# Patient Record
Sex: FEMALE | Race: WHITE
[De-identification: names, ages, dates, MRNs, and addresses within clinical notes are randomized per-mention and may not be internally consistent; named-entity substitution may affect disease eponyms.]

---

## 2017-12-12 ENCOUNTER — HOSPITAL ENCOUNTER (EMERGENCY)
Dept: HOSPITAL 11 - JP.ED | Age: 72
Discharge: HOME | End: 2017-12-12
Payer: MEDICARE

## 2017-12-12 VITALS — SYSTOLIC BLOOD PRESSURE: 157 MMHG | DIASTOLIC BLOOD PRESSURE: 78 MMHG

## 2017-12-12 DIAGNOSIS — F17.210: ICD-10-CM

## 2017-12-12 DIAGNOSIS — Z79.899: ICD-10-CM

## 2017-12-12 DIAGNOSIS — Z88.2: ICD-10-CM

## 2017-12-12 DIAGNOSIS — E03.9: ICD-10-CM

## 2017-12-12 DIAGNOSIS — R42: Primary | ICD-10-CM

## 2017-12-12 DIAGNOSIS — Z88.5: ICD-10-CM

## 2017-12-12 DIAGNOSIS — Z79.82: ICD-10-CM

## 2017-12-12 DIAGNOSIS — I10: ICD-10-CM

## 2017-12-12 PROCEDURE — 85025 COMPLETE CBC W/AUTO DIFF WBC: CPT

## 2017-12-12 PROCEDURE — 36415 COLL VENOUS BLD VENIPUNCTURE: CPT

## 2017-12-12 PROCEDURE — 99284 EMERGENCY DEPT VISIT MOD MDM: CPT

## 2017-12-12 PROCEDURE — 80053 COMPREHEN METABOLIC PANEL: CPT

## 2017-12-12 NOTE — EDM.PDOC
ED HPI GENERAL MEDICAL PROBLEM





- General


Chief Complaint: General


Stated Complaint: MEDICAL VIA NORTH


Time Seen by Provider: 12/12/17 10:22


Source of Information: Reports: Patient, RN Notes Reviewed


History Limitations: Reports: No Limitations





- History of Present Illness


INITIAL COMMENTS - FREE TEXT/NARRATIVE: 





This 72-year-old female presents emergency department today via EMS services 

for sudden onset of nausea and vomiting, she states she's had this for the last 

couple days does have a history of vertigo she states this particular event 

started when she rolled over in bed put her head to the left side sudden onset 

of nausea and vomiting, feels like typical events she's had in the past also 

does have a history of ovarian cancer





- Related Data


 Allergies











Allergy/AdvReac Type Severity Reaction Status Date / Time


 


Sulfa (Sulfonamide Allergy Unknown Rash Verified 12/01/14 11:36





Antibiotics)     


 


codeine AdvReac Unknown Vomiting Verified 12/01/14 11:36











Home Meds: 


 Home Meds





Aspirin 81 mg PO DAILY 09/03/13 [History]


Etodolac 400 mg PO BID 09/03/13 [History]


Levothyroxine Sodium [Synthroid] 75 mcg PO DAILY 09/03/13 [History]


Lisinopril [Prinivil] 20 mg PO DAILY 09/03/13 [History]


Nitroglycerin [Nitrostat] 0.4 mg SL ASDIRECTED PRN 09/03/13 [History]


Venlafaxine HCl [Venlafaxine ER] 150 mg PO DAILY 09/03/13 [History]


Multivitamin [Multivitamins] 1 each PO DAILY 11/08/13 [History]


Metoprolol Tartrate [Lopressor] 100 mg PO BID #60 tablet 12/08/14 [Rx]


Albuterol Sulfate [Proair Hfa] 2 puff INH Q4H PRN 12/12/17 [History]


Nicotine [Nicoderm CQ] 1 patch TOP DAILY 12/12/17 [History]


atorvaSTATin [Lipitor] 20 mg PO DAILY 12/12/17 [History]


tiZANidine [Zanaflex] 4 mg PO TID PRN 12/12/17 [History]











Past Medical History


Cardiovascular History: Reports: Hypertension


Respiratory History: Reports: Bronchitis, Recurrent


OB/GYN History: Reports: Pregnancy


Musculoskeletal History: Reports: Back Pain, Chronic


Endocrine/Metabolic History: Reports: Hypothyroidism


Oncologic (Cancer) History: Reports: Ovarian





- Infectious Disease History


Infectious Disease History: Reports: Chicken Pox, Measles, Mumps, Shingles





- Past Surgical History


GI Surgical History: Reports: Appendectomy, Cholecystectomy


Musculoskeletal Surgical History: Reports: Knee Replacement





Social & Family History





- Tobacco Use


Smoking Status *Q: Current Every Day Smoker


Years of Tobacco use: 50


Packs/Tins Daily: 0.5


Used Tobacco, but Quit: No


Month Tobacco Last Used: December


Second Hand Smoke Exposure: No





- Caffeine Use


Caffeine Use: Reports: Coffee





- Alcohol Use


Days Per Week of Alcohol Use: 1


Number of Drinks Per Day: 2


Total Drinks Per Week: 2





- Recreational Drug Use


Recreational Drug Use: No





ED ROS GENERAL





- Review of Systems


Review Of Systems: See Below


Constitutional: Denies: Fever, Chills


HEENT: Reports: No Symptoms


Respiratory: Reports: No Symptoms


Cardiovascular: Reports: No Symptoms


GI/Abdominal: Reports: No Symptoms


: Reports: No Symptoms





ED EXAM, GENERAL





- Physical Exam


Exam: See Below


Free Text/Narrative:: 





General: Female, not in any distress, alert and oriented x3 HEENT: head is 

atraumatic normocephalic, eyes pupils equal round reactive to light, sclera 

clear no conjunctivitis appreciated extraocular eye movements intact.  Ears 

tympanic membranes clear and gray landmarks and light reflex are present 

bilaterally canals are clear.  Nose no septal deviation, nares are clear, no 

blood present.  Mouth mucosa is dry and pink no erythema or exudate noted in 

soft palate, tongue is midline uvula is midline, dentition is intact.


Neck: Supple no thyromegaly no tracheal deviation.


Nodes: Cervical nodes subclavicular nodes nontender no palpable lymphadenopathy 

noted.


Lungs: clear to auscultation bilaterally with symmetrical respirations, no 

adventitious noise appreciated.


CV: Regular rate and rhythm S1 and S2 appreciated no murmurs rubs or gallops 

noted.


cover uncover test negative, no saccade on HINTS exam





Course





- Vital Signs


Last Recorded V/S: 


 Last Vital Signs











Temp  98.2 F   12/12/17 10:10


 


Pulse  66   12/12/17 10:10


 


Resp  23 H  12/12/17 10:10


 


BP  157/78 H  12/12/17 10:10


 


Pulse Ox  95   12/12/17 10:10














- Orders/Labs/Meds


Orders: 


 Active Orders 24 hr











 Category Date Time Status


 


 UA W/MICROSCOPIC [URIN] Urgent Lab  12/12/17 10:33 Uncollected











Labs: 


 Laboratory Tests











  12/12/17 12/12/17 Range/Units





  10:50 10:50 


 


WBC  9.2   (4.5-11.0)  K/uL


 


RBC  4.17   (3.30-5.50)  M/uL


 


Hgb  13.2  D   (12.0-15.0)  g/dL


 


Hct  41.5   (36.0-48.0)  %


 


MCV  100 H   (80-98)  fL


 


MCH  32 H   (27-31)  pg


 


MCHC  32   (32-36)  %


 


Plt Count  466 H   (150-400)  K/uL


 


Neut % (Auto)  71 H   (36-66)  %


 


Lymph % (Auto)  17 L   (24-44)  %


 


Mono % (Auto)  5   (2-6)  %


 


Eos % (Auto)  5 H   (2-4)  %


 


Baso % (Auto)  1   (0-1)  %


 


Sodium   142  (140-148)  mmol/L


 


Potassium   4.4  (3.6-5.2)  mmol/L


 


Chloride   107  (100-108)  mmol/L


 


Carbon Dioxide   26  (21-32)  mmol/L


 


Anion Gap   9.5  (5.0-14.0)  mmol/L


 


BUN   16  (7-18)  mg/dL


 


Creatinine   1.3 H  (0.6-1.0)  mg/dL


 


Est Cr Clr Drug Dosing   30.03  mL/min


 


Estimated GFR (MDRD)   40 L  (>60)  


 


Glucose   117 H  ()  mg/dL


 


Calcium   9.2  D  (8.5-10.1)  mg/dL


 


Total Bilirubin   0.3  (0.2-1.0)  mg/dL


 


AST   18  (15-37)  U/L


 


ALT   15  D  (12-78)  U/L


 


Alkaline Phosphatase   109  ()  U/L


 


Total Protein   7.2  (6.4-8.2)  g/dL


 


Albumin   1.9 L  (3.4-5.0)  g/dL


 


Globulin   5.3 H  (2.3-3.5)  g/dL


 


Albumin/Globulin Ratio   0.4 L  (1.2-2.2)  











Meds: 


Medications














Discontinued Medications














Generic Name Dose Route Start Last Admin





  Trade Name Freq  PRN Reason Stop Dose Admin


 


Meclizine HCl  25 mg  12/12/17 11:04  12/12/17 11:08





  Antivert  PO  12/12/17 11:05  25 mg





  ONETIME ONE   Administration














Departure





- Departure


Time of Disposition: 12:39


Disposition: Home, Self-Care 01


Condition: Good


Clinical Impression: 


 Vertigo








- Discharge Information


Referrals: 


Radha Williamson CNM [Primary Care Provider] - 


Forms:  ED Department Discharge


Additional Instructions: 


Continue your regular medications, try the meclizine 1 tablet up to 3 times a 

day as needed for dizzy symptoms,  Please followup with your primary care 

provider in 3-5 days if not better, please call return to the emergency 

department with worsening of symptoms.





- My Orders


Last 24 Hours: 


My Active Orders





12/12/17 10:33


UA W/MICROSCOPIC [URIN] Urgent 














- Assessment/Plan


Last 24 Hours: 


My Active Orders





12/12/17 10:33


UA W/MICROSCOPIC [URIN] Urgent 











Plan: 





Assessment





Acuity = acute on chronic





Site and laterality = exacerbation of vertigo





Etiology  unclear etiology





Manifestations = none





Location of injury =  Home





Lab values = creatinine elevated 1.3 consistent chronic renal failure stage G 

IIIB albumin low at 1.9 consistent hypoalbuminemia EKG performed in route by 

EMS demonstrates a normal sinus rhythm no ST changes or elevations noted





Plan


 she had good improvement with meclizine provided with resolution of her 

symptoms follow-up with her primary care as needed

















Patient was in agreement with the plan all questions were answered, they were 

instructed to return to the emergency department or call for worsening 

symptoms.  This note was dictated using dragon voice recognition software 

please call with any questions.

## 2018-03-31 ENCOUNTER — HOSPITAL ENCOUNTER (INPATIENT)
Dept: HOSPITAL 11 - JP.ED | Age: 73
LOS: 2 days | Discharge: HOME | DRG: 372 | End: 2018-04-02
Attending: INTERNAL MEDICINE | Admitting: INTERNAL MEDICINE
Payer: MEDICARE

## 2018-03-31 DIAGNOSIS — A04.72: Primary | ICD-10-CM

## 2018-03-31 DIAGNOSIS — C56.2: ICD-10-CM

## 2018-03-31 DIAGNOSIS — R68.84: ICD-10-CM

## 2018-03-31 DIAGNOSIS — F32.9: ICD-10-CM

## 2018-03-31 DIAGNOSIS — F17.200: ICD-10-CM

## 2018-03-31 DIAGNOSIS — C56.9: ICD-10-CM

## 2018-03-31 DIAGNOSIS — I12.9: ICD-10-CM

## 2018-03-31 DIAGNOSIS — Z88.8: ICD-10-CM

## 2018-03-31 DIAGNOSIS — Z79.899: ICD-10-CM

## 2018-03-31 DIAGNOSIS — I10: ICD-10-CM

## 2018-03-31 DIAGNOSIS — E86.0: ICD-10-CM

## 2018-03-31 DIAGNOSIS — Z88.2: ICD-10-CM

## 2018-03-31 DIAGNOSIS — N18.3: ICD-10-CM

## 2018-03-31 DIAGNOSIS — E03.9: ICD-10-CM

## 2018-03-31 PROCEDURE — 87046 STOOL CULTR AEROBIC BACT EA: CPT

## 2018-03-31 PROCEDURE — 87899 AGENT NOS ASSAY W/OPTIC: CPT

## 2018-03-31 PROCEDURE — 85025 COMPLETE CBC W/AUTO DIFF WBC: CPT

## 2018-03-31 PROCEDURE — 87493 C DIFF AMPLIFIED PROBE: CPT

## 2018-03-31 PROCEDURE — 99285 EMERGENCY DEPT VISIT HI MDM: CPT

## 2018-03-31 PROCEDURE — 71045 X-RAY EXAM CHEST 1 VIEW: CPT

## 2018-03-31 PROCEDURE — 36415 COLL VENOUS BLD VENIPUNCTURE: CPT

## 2018-03-31 PROCEDURE — 84484 ASSAY OF TROPONIN QUANT: CPT

## 2018-03-31 PROCEDURE — 96360 HYDRATION IV INFUSION INIT: CPT

## 2018-03-31 PROCEDURE — 80053 COMPREHEN METABOLIC PANEL: CPT

## 2018-03-31 RX ADMIN — VANCOMYCIN HYDROCHLORIDE SCH MG: 1 INJECTION, POWDER, LYOPHILIZED, FOR SOLUTION INTRAVENOUS at 21:49

## 2018-03-31 RX ADMIN — VENLAFAXINE HYDROCHLORIDE SCH MG: 75 CAPSULE, EXTENDED RELEASE ORAL at 20:15

## 2018-03-31 RX ADMIN — VANCOMYCIN HYDROCHLORIDE SCH MG: 1 INJECTION, POWDER, LYOPHILIZED, FOR SOLUTION INTRAVENOUS at 17:20

## 2018-03-31 RX ADMIN — Medication SCH CAP: at 20:16

## 2018-03-31 NOTE — PCM.HP
H&P History of Present Illness





- General


Date of Service: 03/31/18


Admit Problem/Dx: 


 Admission Diagnosis/Problem





Admission Diagnosis/Problem      Clostridium difficile diarrhea








Source of Information: Patient, Provider


History Limitations: Reports: No Limitations





- History of Present Illness


Initial Comments - Free Text/Narative: 





Wen presented to the ER this morning with bilateral jaw pressure and left 

shoulder pain. She reports onset of moderately severe pressure in her jaws that 

radiated to the left shoulder. She will not call it pain but says it was more 

of a pressure discomfort. When the pain did not get better after a few minutes 

she decided take a nitroglycerin which completely relieved the pain. She has 

never had pain like this before. She was concerned because her  had jaw 

pain at the time of a heart attack years ago. She also reports having multiple 

watery diarrheal stools over the past several days. She has had significant 

nausea for the last 3 or 4 days and has had very little to eat or drink other 

than supper last night. She is not aware of any fevers. She does not have any 

abdominal pain. She has not vomited. No recent antibiotics. She has had recent 

chemotherapy for her ovarian cancer.





Workup in the emergency room revealed leukopenia and evidence for Clostridium 

difficile colitis. EKG did not suggest ischemia and troponin was undetectable. 

She will be admitted for management of Clostridium difficile colitis.


  ** Denies


Pain Score (Numeric/FACES): 0





- Related Data


Allergies/Adverse Reactions: 


 Allergies











Allergy/AdvReac Type Severity Reaction Status Date / Time


 


Sulfa (Sulfonamide Allergy Unknown Rash Verified 03/31/18 09:37





Antibiotics)     


 


codeine AdvReac Unknown Vomiting Verified 03/31/18 09:37











Home Medications: 


 Home Meds





Aspirin 81 mg PO DAILY 09/03/13 [History]


Etodolac 400 mg PO BID 09/03/13 [History]


Levothyroxine Sodium [Synthroid] 75 mcg PO DAILY 09/03/13 [History]


Lisinopril [Prinivil] 5 mg PO DAILY 09/03/13 [History]


Nitroglycerin [Nitrostat] 0.4 mg SL ASDIRECTED PRN 09/03/13 [History]


Venlafaxine HCl [Venlafaxine ER] 150 mg PO DAILY 09/03/13 [History]


Multivitamin [Multivitamins] 1 each PO DAILY 11/08/13 [History]


Metoprolol Tartrate [Lopressor] 100 mg PO BID #60 tablet 12/08/14 [Rx]


Albuterol Sulfate [Proair Hfa] 2 puff INH Q4H PRN 12/12/17 [History]


atorvaSTATin [Lipitor] 20 mg PO DAILY 12/12/17 [History]


tiZANidine [Zanaflex] 4 mg PO TID PRN 12/12/17 [History]


Prochlorperazine Maleate [Compazine] 10 mg PO ASDIRECTED PRN 03/28/18 [History]











Past Medical History


HEENT History: Reports: Hard of Hearing, Impaired Vision


Cardiovascular History: Reports: Hypertension


Respiratory History: Reports: Bronchitis, Recurrent


Genitourinary History: Reports: Other (See Below)


Other Genitourinary History: l kidney not working.


OB/GYN History: Reports: Pregnancy


Musculoskeletal History: Reports: Back Pain, Chronic


Neurological History: Reports: Other (See Below)


Other Neuro History: fibro myalgia


Psychiatric History: Reports: Depression


Endocrine/Metabolic History: Reports: Hypothyroidism


Oncologic (Cancer) History: Reports: Ovarian





- Infectious Disease History


Infectious Disease History: Reports: Chicken Pox, Measles, Mumps, Shingles





- Past Surgical History


GI Surgical History: Reports: Appendectomy, Cholecystectomy


Musculoskeletal Surgical History: Reports: Knee Replacement





Social & Family History





- Family History


Cardiac: Reports: Afib, Arrhythmia





- Tobacco Use


Smoking Status *Q: Current Every Day Smoker


Years of Tobacco use: 50


Packs/Tins Daily: 0.5


Used Tobacco, but Quit: No


Month/Year Tobacco Last Used: December


Second Hand Smoke Exposure: No





- Caffeine Use


Caffeine Use: Reports: Coffee





- Alcohol Use


Days Per Week of Alcohol Use: 1


Number of Drinks Per Day: 2


Total Drinks Per Week: 2





- Recreational Drug Use


Recreational Drug Use: No





H&P Review of Systems





- Review of Systems:


Review Of Systems: See Below


Free Text/Narrative: 





A complete 12 point review of systems was obtained.  Pertinent positives and 

negatives are noted in the history of present illness.  All other systems were 

reviewed and were negative except as noted.





Exam





- Exam


Exam: See Below





- Vital Signs


Vital Signs: 


 Last Vital Signs











Temp  35.7 C   03/31/18 09:10


 


Pulse  68   03/31/18 09:10


 


Resp  22 H  03/31/18 09:10


 


BP  113/77   03/31/18 09:10


 


Pulse Ox  97   03/31/18 09:10











Weight: 89.358 kg





- Exam


Quality Assessment: No: Supplemental Oxygen


General: Alert, Oriented, Cooperative.  No: Mild Distress


HEENT: Conjunctiva Clear, Other (poor dentition ).  No: Mucosa Moist & Pink (dry

), Scleral Icterus


Neck: Supple, Trachea Midline


Lungs: Clear to Auscultation, Normal Respiratory Effort


Cardiovascular: Regular Rate, Regular Rhythm, Systolic Murmur


GI/Abdominal Exam: Normal Bowel Sounds, Soft, Non-Tender, No Distention


Back Exam: Normal Inspection, Full Range of Motion


Extremities: No Pedal Edema.  No: Increased Warmth


Peripheral Pulses: 2+: Dorsalis Pedis (L), Dorsalis Pedis (R)


Skin: Warm, Dry


Neuro Extensive - Mental Status: Alert, Oriented x3, Nl Response to Commands


Neuro Extensive - Motor, Sensory, Reflexes: CN II-XII Intact.  No: Dysarthria, 

Abnormal Motor, Tremor





- Patient Data


Lab Results Last 24 hrs: 


 Laboratory Results - last 24 hr











  03/31/18 03/31/18 03/31/18 Range/Units





  08:51 08:51 08:51 


 


WBC   1.6 L   (4.5-11.0)  K/uL


 


RBC   3.74   (3.30-5.50)  M/uL


 


Hgb   12.0   (12.0-15.0)  g/dL


 


Hct   36.0   (36.0-48.0)  %


 


MCV   96   (80-98)  fL


 


MCH   32 H   (27-31)  pg


 


MCHC   33   (32-36)  %


 


Plt Count   451 H   (150-400)  K/uL


 


Neut % (Auto)   8 L   (36-66)  %


 


Lymph % (Auto)   61 H   (24-44)  %


 


Mono % (Auto)   21 H   (2-6)  %


 


Eos % (Auto)   7 H   (2-4)  %


 


Baso % (Auto)   3 H   (0-1)  %


 


Sodium    136 L  (140-148)  mmol/L


 


Potassium    4.4  (3.6-5.2)  mmol/L


 


Chloride    102  (100-108)  mmol/L


 


Carbon Dioxide    23  (21-32)  mmol/L


 


Anion Gap    15.4 H  (5.0-14.0)  mmol/L


 


BUN    46 H D  (7-18)  mg/dL


 


Creatinine    1.5 H  (0.6-1.0)  mg/dL


 


Est Cr Clr Drug Dosing    25.63  mL/min


 


Estimated GFR (MDRD)    34 L  (>60)  


 


Glucose    118 H  ()  mg/dL


 


Calcium    8.9  (8.5-10.1)  mg/dL


 


Total Bilirubin    0.3  (0.2-1.0)  mg/dL


 


AST    33  D  (15-37)  U/L


 


ALT    30  D  (12-78)  U/L


 


Alkaline Phosphatase    95  ()  U/L


 


Troponin I  < 0.017    (0.000-0.056)  ng/mL


 


Total Protein    7.2  (6.4-8.2)  g/dL


 


Albumin    2.8 L  (3.4-5.0)  g/dL


 


Globulin    4.4 H  (2.3-3.5)  g/dL


 


Albumin/Globulin Ratio    0.6 L  (1.2-2.2)  











Result Diagrams: 


 03/31/18 08:51





 03/31/18 08:51


Sunday Results Last 24 hrs: 


 Microbiology











 03/31/18 09:23 Clostridium difficile (PCR) - Final





 Stool / Feces    Positive C. Diff Toxin











Imaging Impressions Last 24 hrs: 





CXR - images personally reviewed - no mass, infiltrate  noted. Probable small 

right effusion. Heart size borderline enlarged. 





*Q Meaningful Use (ADM)





- VTE Risk Assess *Q


Each Risk Factor Represents 1 Point: Obesity ( BMI > 25 kg/m2)


Total Score 1 Point Risk Factors: 1


Each Risk Factor Represents 2 Points: Malignancy (present or previous)


Total Score 2 Point Risk Factors: 2


Each Risk Factor Represents 3 Points: Age 75 Years or Greater


Total Score 3 Point Risk Factors: 3


Each Risk Factor Represents 5 Points: None


Total Score 5 Point Risk Factors: 0


Venous Thromboembolism Risk Factor Score *Q: 6





- Problem List


(1) Clostridium difficile diarrhea


SNOMED Code(s): 7496075228382


   ICD Code: A04.72 - ENTEROCOLITIS D/T CLOSTRIDIUM DIFFICILE, NOT SPCF AS 

RECUR   Status: Acute   Current Visit: Yes   





(2) Dehydration


SNOMED Code(s): 70989355


   ICD Code: E86.0 - DEHYDRATION   Status: Acute   Current Visit: Yes   





(3) Jaw pain


SNOMED Code(s): 200070804


   ICD Code: R68.84 - JAW PAIN   Status: Acute   Current Visit: Yes   





(4) Ovarian cancer on left


SNOMED Code(s): 704518207


   ICD Code: C56.2 - MALIGNANT NEOPLASM OF LEFT OVARY   Status: Chronic   

Current Visit: Yes   





(5) CKD (chronic kidney disease), stage III


SNOMED Code(s): 000916974


   ICD Code: N18.3 - CHRONIC KIDNEY DISEASE, STAGE 3 (MODERATE)   Status: 

Chronic   Current Visit: Yes   


Problem List Initiated/Reviewed/Updated: Yes


Orders Last 24hrs: 


 Active Orders 24 hr











 Category Date Time Status


 


 Patient Status Manage Transfer [TRANSFER] Routine ADT  03/31/18 11:24 Ordered


 


 Chest 1V Frontal [CR] Stat Exams  03/31/18 10:14 Taken


 


 CLOSTRIDIUM DIFFICILE BY PCR [RM] Stat Lab  03/31/18 09:23 Ordered


 


 CULTURE STOOL + SHIGATOX [RM] Stat Lab  03/31/18 09:23 Received


 


 UA W/MICROSCOPIC [URIN] Urgent Lab  03/31/18 08:51 Ordered


 


 Sodium Chloride 0.9% [Normal Saline] 1,000 ml Med  03/31/18 09:15 Active





 IV ASDIRECTED   


 


 Resuscitation Status Routine Resus Stat  03/31/18 11:26 Ordered








 Medication Orders





Sodium Chloride (Normal Saline)  1,000 mls @ 999 mls/hr IV ASDIRECTED KAILEY


   Last Admin: 03/31/18 09:24  Dose: 999 mls/hr








Assessment/Plan Comment:: 





ASSESSMENT AND PLAN - 





Clostridium difficile colitis - no recent antibiotics. Suspect immunosuppressed 

state as the cause. Infection is complicated by active malignancy and 

chemotherapy. She is moderately dehydrated but does not appear acutely ill 

beyond that.


-Vancomycin 125 mg 4 times daily


-IV fluids through the day


-Contact precautions 


-probiotics





Jaw pain/pressure - Some concern for anginal equivalent. Initial troponin and 

EKG did not suggest ischemia. Pain is resolved at this time. His 

gastrointestinal source for pain most likely given recent nausea and active 

colitis.


-Second troponin


-Twice daily PPI


-Additional symptomatic management





Stage III chronic kidney disease - Creatinine is very near recent baseline but 

there is evidence for some dehydration.


-IV fluids


-Repeat labs in the morning





Ovarian cancer - Followed by the [facility], receiving chemotherapy locally. 

Has had recent chemotherapy.


-Outpatient follow-up





Maintenance issues - 


- DVT prophylaxis - Mechanical 


- GI prophylaxis - PPI


- Nutrition - mechanical soft


- Stanley catheter - Not indicated





CODE STATUS - Full code





Admission justification - This patient will be admitted for inpatient services 

and is medically appropriate meeting medical necessity for inpatient admission 

as outlined in my documentation.  I reasonably expect the patient will require 

inpatient services that span a period time over 2 midnights. I reasonably 

expect this patient to be discharged or transferred within 96 hours after 

admission to the [facility].





Disposition - Anticipate discharge home after the hospital stay





Primary care physician - Leatha Yañez M.D.

## 2018-03-31 NOTE — EDM.PDOC
ED HPI GENERAL MEDICAL PROBLEM





- General


Chief Complaint: General


Stated Complaint: MEDICAL VIA NORTH


Time Seen by Provider: 03/31/18 09:28


Source of Information: Reports: Patient


History Limitations: Reports: No Limitations





- History of Present Illness


INITIAL COMMENTS - FREE TEXT/NARRATIVE: 





pt developed severe jaw pain which wouldn,t go away. She states she took 1 

nitro at home and it got alot better. 


Onset: Today, Sudden, Other (pt just had chemo and she has been having severe 

diarrhea. )


Duration: Hour(s):


Location: Reports: Chest, Other (pt has a history of ovarian ca. )


Associated Symptoms: Reports: Other (diarhea)


  ** Denies


Pain Score (Numeric/FACES): 0





  ** Generalized


Pain Score (Numeric/FACES): 0





- Related Data


 Allergies











Allergy/AdvReac Type Severity Reaction Status Date / Time


 


Sulfa (Sulfonamide Allergy Unknown Rash Verified 03/31/18 09:37





Antibiotics)     


 


codeine AdvReac Unknown Vomiting Verified 03/31/18 09:37











Home Meds: 


 Home Meds





Aspirin 81 mg PO DAILY 09/03/13 [History]


Etodolac 400 mg PO BID 09/03/13 [History]


Levothyroxine Sodium [Synthroid] 75 mcg PO DAILY 09/03/13 [History]


Lisinopril [Prinivil] 5 mg PO DAILY 09/03/13 [History]


Nitroglycerin [Nitrostat] 0.4 mg SL ASDIRECTED PRN 09/03/13 [History]


Venlafaxine HCl [Venlafaxine ER] 150 mg PO DAILY 09/03/13 [History]


Multivitamin [Multivitamins] 1 each PO DAILY 11/08/13 [History]


Metoprolol Tartrate [Lopressor] 100 mg PO BID #60 tablet 12/08/14 [Rx]


Albuterol Sulfate [Proair Hfa] 2 puff INH Q4H PRN 12/12/17 [History]


atorvaSTATin [Lipitor] 20 mg PO DAILY 12/12/17 [History]


tiZANidine [Zanaflex] 4 mg PO TID PRN 12/12/17 [History]


Prochlorperazine Maleate [Compazine] 10 mg PO ASDIRECTED PRN 03/28/18 [History]


Lactobacillus Rhamnosus GG [Culturelle] 1 cap PO BID #60 cap 04/02/18 [Rx]


Pantoprazole [ProTONIX***] 40 mg PO DAILY #30 tab.cr 04/02/18 [Rx]


Vancomycin [Vancocin 250 MG/5 ML Soln] 125 mg PO QID #120 ml 04/02/18 [Rx]











Past Medical History


Cardiovascular History: Reports: Hypertension


Respiratory History: Reports: Bronchitis, Recurrent


OB/GYN History: Reports: Pregnancy


Musculoskeletal History: Reports: Back Pain, Chronic


Endocrine/Metabolic History: Reports: Hypothyroidism


Oncologic (Cancer) History: Reports: Ovarian





- Infectious Disease History


Infectious Disease History: Reports: Chicken Pox, Measles, Mumps, Shingles





- Past Surgical History


GI Surgical History: Reports: Appendectomy, Cholecystectomy


Musculoskeletal Surgical History: Reports: Knee Replacement





Social & Family History





- Tobacco Use


Smoking Status *Q: Current Every Day Smoker


Years of Tobacco use: 50


Packs/Tins Daily: 0.5


Used Tobacco, but Quit: No


Month/Year Tobacco Last Used: December


Second Hand Smoke Exposure: No





- Caffeine Use


Caffeine Use: Reports: Coffee





- Alcohol Use


Days Per Week of Alcohol Use: 1


Number of Drinks Per Day: 2


Total Drinks Per Week: 2





- Recreational Drug Use


Recreational Drug Use: No





ED ROS GENERAL





- Review of Systems


Review Of Systems: See Below


Constitutional: Reports: No Symptoms


HEENT: Reports: No Symptoms


Respiratory: Reports: No Symptoms


Cardiovascular: Reports: No Symptoms


Endocrine: Reports: No Symptoms


GI/Abdominal: Reports: Diarrhea, Other ( chest pain. )


: Reports: No Symptoms


Musculoskeletal: Reports: No Symptoms


Skin: Reports: No Symptoms





ED EXAM, GENERAL





- Physical Exam


Exam: See Below


Free Text/Narrative:: 





pt developed severe  jaw pain when she got up this am. She has had multiple 

loose stools during the nite. She did just finish a course of chemo. 


Exam Limited By: No Limitations


General Appearance: Alert, Mild Distress, Other ( The jaw pain went away with 

the one nitro and she is pain free now. She has had multiple loose stools since 

she arrived. )


Ears: Normal TMs


Nose: Normal Inspection


Throat/Mouth: Normal Inspection


Head: Atraumatic


Neck: Normal Inspection


Respiratory/Chest: No Respiratory Distress


Cardiovascular: Regular Rate, Rhythm, Tachycardia


GI/Abdominal: Soft, Non-Tender, Other ( The stools she is having is very loose 

and alot of mucous)


 (Female) Exam: Deferred


Rectal (Female) Exam: Deferred


Back Exam: Normal Inspection


Extremities: Normal Inspection


Neurological: Alert, Oriented, Normal Cognition


Psychiatric: Normal Affect





Course





- Vital Signs


Last Recorded V/S: 


 Last Vital Signs











Temp  36.3 C   04/02/18 07:48


 


Pulse  66   04/02/18 08:01


 


Resp  18   04/02/18 07:48


 


BP  135/79   04/02/18 08:01


 


Pulse Ox  100   04/02/18 07:48














- Orders/Labs/Meds


Labs: 


 Laboratory Tests











  03/31/18 03/31/18 03/31/18 Range/Units





  08:51 08:51 08:51 


 


WBC   1.6 L   (4.5-11.0)  K/uL


 


RBC   3.74   (3.30-5.50)  M/uL


 


Hgb   12.0   (12.0-15.0)  g/dL


 


Hct   36.0   (36.0-48.0)  %


 


MCV   96   (80-98)  fL


 


MCH   32 H   (27-31)  pg


 


MCHC   33   (32-36)  %


 


Plt Count   451 H   (150-400)  K/uL


 


Neut % (Auto)   8 L   (36-66)  %


 


Lymph % (Auto)   61 H   (24-44)  %


 


Mono % (Auto)   21 H   (2-6)  %


 


Eos % (Auto)   7 H   (2-4)  %


 


Baso % (Auto)   3 H   (0-1)  %


 


Sodium    136 L  (140-148)  mmol/L


 


Potassium    4.4  (3.6-5.2)  mmol/L


 


Chloride    102  (100-108)  mmol/L


 


Carbon Dioxide    23  (21-32)  mmol/L


 


Anion Gap    15.4 H  (5.0-14.0)  mmol/L


 


BUN    46 H D  (7-18)  mg/dL


 


Creatinine    1.5 H  (0.6-1.0)  mg/dL


 


Est Cr Clr Drug Dosing    25.63  mL/min


 


Estimated GFR (MDRD)    34 L  (>60)  


 


Glucose    118 H  ()  mg/dL


 


Calcium    8.9  (8.5-10.1)  mg/dL


 


Total Bilirubin    0.3  (0.2-1.0)  mg/dL


 


AST    33  D  (15-37)  U/L


 


ALT    30  D  (12-78)  U/L


 


Alkaline Phosphatase    95  ()  U/L


 


Troponin I  < 0.017    (0.000-0.056)  ng/mL


 


Total Protein    7.2  (6.4-8.2)  g/dL


 


Albumin    2.8 L  (3.4-5.0)  g/dL


 


Globulin    4.4 H  (2.3-3.5)  g/dL


 


Albumin/Globulin Ratio    0.6 L  (1.2-2.2)  











Meds: 


Medications














Discontinued Medications














Generic Name Dose Route Start Last Admin





  Trade Name Freq  PRN Reason Stop Dose Admin


 


Acetaminophen  650 mg  03/31/18 13:20  04/02/18 04:14





  Tylenol  PO   650 mg





  Q4H PRN   Administration





  Pain (Mild 1-3)/fever   





     





     





     


 


Aspirin  81 mg  04/01/18 09:00  04/02/18 08:00





  Aspirin  PO   81 mg





  DAILY KAILEY   Administration





     





     





     





     


 


Atorvastatin Calcium  20 mg  03/31/18 21:00  04/01/18 20:50





  Lipitor  PO   Not Given





  BEDTIME KAILEY   





     





     





     





     


 


Etodolac  400 mg  03/31/18 21:00  





  Etodolac  PO   





  BID KAILEY   





     





     





     





     


 


Sodium Chloride  1,000 mls @ 999 mls/hr  03/31/18 09:15  03/31/18 09:24





  Normal Saline  IV   999 mls/hr





  ASDIRECTED KAILEY   Administration





     





     





     





     


 


Sodium Chloride  1,000 mls @ 125 mls/hr  03/31/18 13:20  04/01/18 06:43





  Normal Saline  IV   125 mls/hr





  ASDIRECTED KAILEY   Administration





     





     





     





     


 


Lactobacillus Rhamnosus  1 cap  03/31/18 21:00  04/02/18 08:00





  Culturelle  PO   1 cap





  BID KAILEY   Administration





     





     





     





     


 


Levothyroxine Sodium  75 mcg  04/01/18 07:30  04/02/18 07:59





  Levothyroxine  PO   75 mcg





  DAILY@0730 KAILEY   Administration





     





     





     





     


 


Lisinopril  5 mg  04/01/18 09:00  04/02/18 08:00





  Prinivil  PO   5 mg





  DAILY KAILEY   Administration





     





     





     





     


 


Loperamide HCl  4 mg  03/31/18 09:30  03/31/18 09:24





  Imodium  PO  03/31/18 09:31  4 mg





  ONETIME ONE   Administration





     





     





     





     


 


Metoprolol Tartrate  100 mg  03/31/18 21:00  04/02/18 08:01





  Lopressor  PO   100 mg





  BID KAILEY   Administration





     





     





     





     


 


Ondansetron HCl  4 mg  03/31/18 13:20  





  Zofran Odt  PO   





  Q6H PRN   





  Nausea able to take PO   





     





     





     


 


Ondansetron HCl  4 mg  03/31/18 13:20  





  Zofran  IV   





  Q6H PRN   





  Nausea/Vomiting   





     





     





     


 


Oxycodone HCl  5 mg  03/31/18 13:20  





  Oxycodone  PO   





  Q4H PRN   





  Pain (moderate 4-6)   





     





     





     


 


Pantoprazole Sodium  40 mg  03/31/18 16:30  04/02/18 07:59





  Protonix***  PO   40 mg





  BIDAC KAILEY   Administration





     





     





     





     


 


Tizanidine HCl  4 mg  03/31/18 13:20  





  Zanaflex  PO   





  TID PRN   





  Spasms   





     





     





     


 


Vancomycin HCl  125 mg  03/31/18 16:00  04/02/18 10:45





  Vancocin 250 Mg/5 Ml Soln  PO   125 mg





  QID KAILEY   Administration





     





     





     





     


 


Venlafaxine HCl  150 mg  04/01/18 09:00  04/02/18 08:01





  Effexor Xr  PO   150 mg





  DAILY AKILEY   Administration





     





     





     





     














- Re-Assessments/Exams


Free Text/Narrative Re-Assessment/Exam: 





03/31/18 10:32


pt has a positive cdiff. She has a normal trop. She has a creatnine of 1.5. She 

has evidence of dehydration. Her wbc is 1500. 





Departure





- Departure


Time of Disposition: 10:33


Disposition: Admitted As Inpatient 64


Condition: Fair


Clinical Impression: 


 Clostridial infection, Dehydration, Jaw pain








- Discharge Information

## 2018-04-01 RX ADMIN — Medication SCH CAP: at 08:05

## 2018-04-01 RX ADMIN — VANCOMYCIN HYDROCHLORIDE SCH MG: 1 INJECTION, POWDER, LYOPHILIZED, FOR SOLUTION INTRAVENOUS at 10:37

## 2018-04-01 RX ADMIN — VANCOMYCIN HYDROCHLORIDE SCH MG: 1 INJECTION, POWDER, LYOPHILIZED, FOR SOLUTION INTRAVENOUS at 21:00

## 2018-04-01 RX ADMIN — VANCOMYCIN HYDROCHLORIDE SCH MG: 1 INJECTION, POWDER, LYOPHILIZED, FOR SOLUTION INTRAVENOUS at 16:24

## 2018-04-01 RX ADMIN — VENLAFAXINE HYDROCHLORIDE SCH MG: 75 CAPSULE, EXTENDED RELEASE ORAL at 08:07

## 2018-04-01 RX ADMIN — Medication SCH CAP: at 20:49

## 2018-04-01 RX ADMIN — VANCOMYCIN HYDROCHLORIDE SCH MG: 1 INJECTION, POWDER, LYOPHILIZED, FOR SOLUTION INTRAVENOUS at 06:42

## 2018-04-01 NOTE — PCM.PN
- General Info


Date of Service: 04/01/18


Functional Status: Reports: Pain Controlled, Tolerating Diet





- Review of Systems


General: Denies: Fever


Cardiovascular: Denies: Chest Pain


Gastrointestinal: Reports: Diarrhea


Systems Review Comment:: 





no acute events overnight. No recurrence of the jaw pain overnight but she did 

have some pain this morning. Bilateral jaw pain resolved after drinking water 

and belching. She has had several episodes of watery diarrhea but no fevers or 

abdominal pain. White blood cell count is slightly better today but absolute 

neutrophil count is still very low. Patient feels well and is tolerating her 

diet. She is hoping to be able to go home tomorrow.





- Patient Data


Vitals - Most Recent: 


 Last Vital Signs











Temp  37.0 C   04/01/18 08:01


 


Pulse  73   04/01/18 08:05


 


Resp  16   04/01/18 08:01


 


BP  130/62   04/01/18 08:06


 


Pulse Ox  97   04/01/18 08:01











Weight - Most Recent: 88.723 kg


I&O - Last 24 Hours: 


 Intake & Output











 03/31/18 04/01/18 04/01/18





 22:59 06:59 14:59


 


Intake Total 480 1982 


 


Output Total 100 850 


 


Balance 380 1132 











Lab Results Last 24 Hours: 


 Laboratory Results - last 24 hr











  03/31/18 04/01/18 04/01/18 Range/Units





  15:21 01:05 05:18 


 


WBC    2.2 L  (4.5-11.0)  K/uL


 


RBC    3.37  (3.30-5.50)  M/uL


 


Hgb    10.8 L  (12.0-15.0)  g/dL


 


Hct    33.1 L  (36.0-48.0)  %


 


MCV    98  (80-98)  fL


 


MCH    32 H  (27-31)  pg


 


MCHC    33  (32-36)  %


 


Plt Count    351  (150-400)  K/uL


 


Neut % (Auto)    6 L  (36-66)  %


 


Lymph % (Auto)    62 H  (24-44)  %


 


Mono % (Auto)    23 H  (2-6)  %


 


Eos % (Auto)    7 H  (2-4)  %


 


Baso % (Auto)    2 H  (0-1)  %


 


Sodium     (140-148)  mmol/L


 


Potassium     (3.6-5.2)  mmol/L


 


Chloride     (100-108)  mmol/L


 


Carbon Dioxide     (21-32)  mmol/L


 


Anion Gap     (5.0-14.0)  mmol/L


 


BUN     (7-18)  mg/dL


 


Creatinine     (0.6-1.0)  mg/dL


 


Est Cr Clr Drug Dosing     mL/min


 


Estimated GFR (MDRD)     (>60)  


 


Glucose     ()  mg/dL


 


Calcium     (8.5-10.1)  mg/dL


 


Magnesium     (1.8-2.4)  mg/dL


 


Troponin I  < 0.017    (0.000-0.056)  ng/mL


 


Urine Color   Yellow   


 


Urine Appearance   Cloudy   


 


Urine pH   5.0   (4.5-8.0)  


 


Ur Specific Gravity   1.020   (1.008-1.030)  


 


Urine Protein   Negative   (NEGATIVE)  mg/dL


 


Urine Glucose (UA)   Normal   (NEGATIVE)  mg/dL


 


Urine Ketones   Negative   (NEGATIVE)  mg/dL


 


Urine Occult Blood   Negative   (NEGATIVE)  


 


Urine Nitrite   Negative   (NEGATIVE)  


 


Urine Bilirubin   Small   (NEGATIVE)  


 


Urine Urobilinogen   Normal   (NORMAL)  mg/dL


 


Ur Leukocyte Esterase   Negative   (NEGATIVE)  


 


Urine RBC   Not seen   (0-5)  


 


Urine WBC   0-5   (0-5)  


 


Ur Epithelial Cells   Few   


 


Amorphous Sediment   Not seen   


 


Urine Bacteria   Many   


 


Urine Mucus   Not seen   














  04/01/18 Range/Units





  05:18 


 


WBC   (4.5-11.0)  K/uL


 


RBC   (3.30-5.50)  M/uL


 


Hgb   (12.0-15.0)  g/dL


 


Hct   (36.0-48.0)  %


 


MCV   (80-98)  fL


 


MCH   (27-31)  pg


 


MCHC   (32-36)  %


 


Plt Count   (150-400)  K/uL


 


Neut % (Auto)   (36-66)  %


 


Lymph % (Auto)   (24-44)  %


 


Mono % (Auto)   (2-6)  %


 


Eos % (Auto)   (2-4)  %


 


Baso % (Auto)   (0-1)  %


 


Sodium  141  (140-148)  mmol/L


 


Potassium  4.2  (3.6-5.2)  mmol/L


 


Chloride  108  (100-108)  mmol/L


 


Carbon Dioxide  22  (21-32)  mmol/L


 


Anion Gap  11.4  (5.0-14.0)  mmol/L


 


BUN  29 H  (7-18)  mg/dL


 


Creatinine  1.3 H  (0.6-1.0)  mg/dL


 


Est Cr Clr Drug Dosing  29.57  mL/min


 


Estimated GFR (MDRD)  40 L  (>60)  


 


Glucose  108 H  ()  mg/dL


 


Calcium  7.9 L  (8.5-10.1)  mg/dL


 


Magnesium  2.0  D  (1.8-2.4)  mg/dL


 


Troponin I   (0.000-0.056)  ng/mL


 


Urine Color   


 


Urine Appearance   


 


Urine pH   (4.5-8.0)  


 


Ur Specific Gravity   (1.008-1.030)  


 


Urine Protein   (NEGATIVE)  mg/dL


 


Urine Glucose (UA)   (NEGATIVE)  mg/dL


 


Urine Ketones   (NEGATIVE)  mg/dL


 


Urine Occult Blood   (NEGATIVE)  


 


Urine Nitrite   (NEGATIVE)  


 


Urine Bilirubin   (NEGATIVE)  


 


Urine Urobilinogen   (NORMAL)  mg/dL


 


Ur Leukocyte Esterase   (NEGATIVE)  


 


Urine RBC   (0-5)  


 


Urine WBC   (0-5)  


 


Ur Epithelial Cells   


 


Amorphous Sediment   


 


Urine Bacteria   


 


Urine Mucus   











Sunday Results Last 24 Hours: 


 Microbiology











 03/31/18 09:23 Stool Culture - Preliminary





 Stool / Feces  - Final





    NEGATIVE FOR SHIGA TOXIN 1





  - Final





    NEGATIVE FOR SHIGA TOXIN 2


 


 03/31/18 09:23 Clostridium difficile (PCR) - Final





 Stool / Feces    Positive C. Diff Toxin











Med Orders - Current: 


 Current Medications





Acetaminophen (Tylenol)  650 mg PO Q4H PRN


   PRN Reason: Pain (Mild 1-3)/fever


   Last Admin: 03/31/18 23:06 Dose:  650 mg


Aspirin (Aspirin)  81 mg PO DAILY [facility]


   Last Admin: 04/01/18 08:05 Dose:  81 mg


Atorvastatin Calcium (Lipitor)  20 mg PO BEDTIME [facility]


   Last Admin: 03/31/18 20:17 Dose:  Not Given


Lactobacillus Rhamnosus (Culturelle)  1 cap PO BID [facility]


   Last Admin: 04/01/18 08:05 Dose:  1 cap


Levothyroxine Sodium (Levothyroxine)  75 mcg PO DAILY@0730 [facility]


   Last Admin: 04/01/18 08:05 Dose:  75 mcg


Lisinopril (Prinivil)  5 mg PO DAILY [facility]


   Last Admin: 04/01/18 08:06 Dose:  5 mg


Metoprolol Tartrate (Lopressor)  100 mg PO BID [facility]


   Last Admin: 04/01/18 08:05 Dose:  100 mg


Ondansetron HCl (Zofran Odt)  4 mg PO Q6H PRN


   PRN Reason: Nausea able to take PO


Ondansetron HCl (Zofran)  4 mg IV Q6H PRN


   PRN Reason: Nausea/Vomiting


Oxycodone HCl (Oxycodone)  5 mg PO Q4H PRN


   PRN Reason: Pain (moderate 4-6)


Pantoprazole Sodium (Protonix***)  40 mg PO BIDAC [facility]


   Last Admin: 04/01/18 08:05 Dose:  40 mg


Tizanidine HCl (Zanaflex)  4 mg PO TID PRN


   PRN Reason: Spasms


Vancomycin HCl (Vancocin 250 Mg/5 Ml Soln)  125 mg PO QID [facility]


   Last Admin: 04/01/18 06:42 Dose:  125 mg


Venlafaxine HCl (Effexor Xr)  150 mg PO DAILY [facility]


   Last Admin: 04/01/18 08:07 Dose:  150 mg





Discontinued Medications





Etodolac (Etodolac)  400 mg PO BID [facility]


Sodium Chloride (Normal Saline)  1,000 mls @ 999 mls/hr IV ASDIRECTED [facility]


   Last Admin: 03/31/18 09:24 Dose:  999 mls/hr


Sodium Chloride (Normal Saline)  1,000 mls @ 125 mls/hr IV ASDIRECTED [facility]


   Last Admin: 04/01/18 06:43 Dose:  125 mls/hr


Loperamide HCl (Imodium)  4 mg PO ONETIME ONE


   Stop: 03/31/18 09:31


   Last Admin: 03/31/18 09:24 Dose:  4 mg











- Exam


Quality Assessment: No: Supplemental Oxygen


General: Alert, Oriented, Cooperative, No Acute Distress


Neck: Supple


Lungs: Clear to Auscultation, Normal Respiratory Effort


Cardiovascular: Regular Rate, Regular Rhythm


GI/Abdominal Exam: Soft, No Distention


Extremities: No Pedal Edema


Skin: Warm, Intact


Psy/Mental Status: Alert, Normal Affect





- Problem List & Annotations


(1) Clostridium difficile diarrhea


SNOMED Code(s): 7474314175044


   Code(s): A04.72 - ENTEROCOLITIS D/T CLOSTRIDIUM DIFFICILE, NOT SPCF AS RECUR

   Status: Acute   Current Visit: Yes   





(2) Dehydration


SNOMED Code(s): 83157683


   Code(s): E86.0 - DEHYDRATION   Status: Acute   Current Visit: Yes   





(3) Jaw pain


SNOMED Code(s): 539818105


   Code(s): R68.84 - JAW PAIN   Status: Acute   Current Visit: Yes   





(4) Ovarian cancer on left


SNOMED Code(s): 215010305


   Code(s): C56.2 - MALIGNANT NEOPLASM OF LEFT OVARY   Status: Chronic   

Current Visit: Yes   





(5) CKD (chronic kidney disease), stage III


SNOMED Code(s): 007978037


   Code(s): N18.3 - CHRONIC KIDNEY DISEASE, STAGE 3 (MODERATE)   Status: 

Chronic   Current Visit: Yes   





- Problem List Review


Problem List Initiated/Reviewed/Updated: Yes





- My Orders


Last 24 Hours: 


My Active Orders





03/31/18 11:26


Resuscitation Status Routine 





03/31/18 12:25


Isolation [COMM] Routine 





03/31/18 13:20


Patient Status [ADT] Routine 


Intake and Output [RC] QSHIFT 


May Shower [RC] ASDIRECTED 


Notify Provider Vital Signs [RC] ASDIRECTED 


Oxygen Therapy [RC] PRN 


Up With Assistance [RC] ASDIRECTED 


VTE/DVT Education [RC] Per Unit Routine 


Vital Signs [RC] Q4H 


Acetaminophen [Tylenol]   650 mg PO Q4H PRN 


Ondansetron [Zofran ODT]   4 mg PO Q6H PRN 


Ondansetron [Zofran]   4 mg IV Q6H PRN 


oxyCODONE   5 mg PO Q4H PRN 


tiZANidine [Zanaflex]   4 mg PO TID PRN 


Sequential Compression Device [OM.PC] Per Unit Routine 





03/31/18 15:41


Assess Discharge Needs [OM.PC] Routine 





03/31/18 16:00


Vancomycin [Vancocin 250 MG/5 ML Soln]   125 mg PO QID 





03/31/18 16:30


Pantoprazole [ProTONIX***]   40 mg PO BIDAC 





03/31/18 18:42


PT Screening [OM.PC] Routine 





03/31/18 21:00


Lactobacillus Rhamnosus GG [Culturelle]   1 cap PO BID 


Metoprolol Tartrate [Lopressor]   100 mg PO BID 


atorvaSTATin [Lipitor]   20 mg PO BEDTIME 





03/31/18 Lunch


Mechanical Soft Diet [DIET] 





04/01/18 07:30


Levothyroxine   75 mcg PO DAILY@0730 





04/01/18 09:00


Aspirin   81 mg PO DAILY 


Lisinopril [Prinivil]   5 mg PO DAILY 


Venlafaxine [Effexor XR]   150 mg PO DAILY 





04/01/18 10:33


Discontinue Telemetry Monitoring [Cardiac Monitoring Discontinue] [RC] Click to 

Edit 


Convert IV to Saline Lock [OM.PC] Routine 





04/02/18 05:00


BASIC METABOLIC PANEL,BMP [CHEM] Timed 


CBC W/O DIFF,HEMOGRAM [HEME] Timed (1) 














- Plan


Plan:: 





ASSESSMENT AND PLAN - 





Clostridium difficile colitis - no recent antibiotics. Suspect immunosuppressed 

state as the cause. Infection is complicated by active malignancy and 

chemotherapy. volume status much better today. Having diarrhea but no pain or 

fevers.


-Vancomycin 125 mg 4 times daily


-saline lock IV fluids


-Contact precautions 


-probiotics





Jaw pain/pressure - Some concern for anginal equivalent but both troponins were 

normal and she has been stable overnight. Suspect gastrointestinal source in 

the setting of infection and recent chemotherapy.


-Twice daily PPI initiated


-Additional symptomatic management





Stage III chronic kidney disease - Creatinine level improved overnight with 

hydration.


-encourage oral intake


-Repeat labs in the morning





Ovarian cancer - Followed by the [facility], receiving chemotherapy locally. 

Has had recent chemotherapy.


-Outpatient follow-up, planning to transfer care to the [facility] 

to be near her family in the [place]





Maintenance issues - 


- DVT prophylaxis - Mechanical 


- GI prophylaxis - PPI


- Nutrition - mechanical soft





Disposition - Anticipate discharge home after the hospital stay, possibly 

tomorrow if stable overnight





Primary care physician - Leatha Yañez M.D.

## 2018-04-02 VITALS — SYSTOLIC BLOOD PRESSURE: 135 MMHG | DIASTOLIC BLOOD PRESSURE: 79 MMHG

## 2018-04-02 RX ADMIN — Medication SCH CAP: at 08:00

## 2018-04-02 RX ADMIN — VANCOMYCIN HYDROCHLORIDE SCH MG: 1 INJECTION, POWDER, LYOPHILIZED, FOR SOLUTION INTRAVENOUS at 06:05

## 2018-04-02 RX ADMIN — VANCOMYCIN HYDROCHLORIDE SCH MG: 1 INJECTION, POWDER, LYOPHILIZED, FOR SOLUTION INTRAVENOUS at 10:45

## 2018-04-02 RX ADMIN — VENLAFAXINE HYDROCHLORIDE SCH MG: 75 CAPSULE, EXTENDED RELEASE ORAL at 08:01

## 2018-04-02 NOTE — CR
Chest 1V Frontal

 

HISTORY: sob

 

COMPARISON: 3/28/2018

 

FINDINGS: 

Lungs appear clear and normally aerated. Cardiomediastinal silhouette is within normal limits for the
 AP technique. Atherosclerotic aorta is redemonstrated. No vascular redistribution or pleural fluid c
an be seen there are degenerative hypertrophic changes both shoulders. Anterolateral aspects are note
d along the thoracic spine.

 

IMPRESSION: 

No acute chest abnormality or significant interval change is identified.

## 2018-04-02 NOTE — PCM.DCSUM1
**Discharge Summary





- Hospital Course


Brief History: This patient is a 73-year-old woman with a known history of 

ovarian cancer currently receiving chemotherapy. She was admitted through the 

emergency department with diarrhea secondary to C. difficile colitis as well as 

jaw and shoulder pain.





- Discharge Data


Discharge Date: 04/02/18


Discharge Disposition: Home, Self-Care 01


Condition: Fair





- Discharge Diagnosis/Problem(s)


(1) Jaw pain


SNOMED Code(s): 060625097


   ICD Code: R68.84 - JAW PAIN   Status: Acute   Current Visit: Yes   





(2) Clostridium difficile diarrhea


SNOMED Code(s): 5543519108125


   ICD Code: A04.72 - ENTEROCOLITIS D/T CLOSTRIDIUM DIFFICILE, NOT SPCF AS 

RECUR   Status: Acute   Current Visit: Yes   





(3) Ovarian cancer on left


SNOMED Code(s): 114159416


   ICD Code: C56.2 - MALIGNANT NEOPLASM OF LEFT OVARY   Status: Chronic   

Current Visit: Yes   





(4) CKD (chronic kidney disease), stage III


SNOMED Code(s): 576414453


   ICD Code: N18.3 - CHRONIC KIDNEY DISEASE, STAGE 3 (MODERATE)   Status: 

Chronic   Current Visit: Yes   





(5) HTN, Essential hypertension


SNOMED Code(s): 73846988


   ICD Code: I10 - ESSENTIAL (PRIMARY) HYPERTENSION   Status: Chronic   Current 

Visit: No   





- Patient Summary/Data


Hospital Course: 





Wen presented to the ER on the morning of admission with bilateral jaw 

pressure and left shoulder pain. She reports onset of moderately severe 

pressure in her jaws that radiated to the left shoulder. She will not call it 

pain but says it was more of a pressure discomfort. When the pain did not get 

better after a few minutes she decided take a nitroglycerin which completely 

relieved the pain. She has never had pain like this before. She was concerned 

because her  had jaw pain at the time of a heart attack years ago. She 

also reports having multiple watery diarrheal stools over the past several 

days. She has had significant nausea for the last 3 or 4 days and has had very 

little to eat or drink other than supper last night. She is not aware of any 

fevers. She does not have any abdominal pain. She has not vomited. No recent 

antibiotics. She has had recent chemotherapy for her ovarian cancer. Workup in 

the emergency room revealed leukopenia and evidence for Clostridium difficile 

colitis. EKG did not suggest ischemia and troponin was undetectable. She was 

admitted for management of Clostridium difficile colitis.





On admission she was given IV fluids for hydration and started on oral 

antibiotic therapy with vancomycin 125 mg 4 times daily. She improved over the 

next 2 days of hospitalization and by the time of discharge was having no 

further episodes of diarrhea. White blood cell count was monitored daily and 

remained low throughout her hospital stay. This was thought secondary to her 

ongoing chemotherapy for management of ovarian carcinoma. Serial troponin 

levels were obtained in all remained within normal range and she had no further 

symptoms of chest pain or pressure. Further review of her symptoms of jaw pain 

were felt to be consistent with a GI source likely esophageal reflux. She will 

be discharged home on additional 12 days of oral antibiotic therapy with 

vancomycin as well as a probiotic and oral Protonix. Activity will be as 

tolerated and she will resume her usual diet. Follow-up appointments have been 

arranged with both oncology as well as her primary care provider.





- Patient Instructions


Diet: Usual Diet as Tolerated


Activity: As Tolerated


Other/Special Instructions: Follow-up appointments with oncology and primary 

care as scheduled





- Discharge Plan


Prescriptions/Med Rec: 


Lactobacillus Rhamnosus GG [Culturelle] 1 cap PO BID #60 cap


Vancomycin [Vancocin 250 MG/5 ML Soln] 125 mg PO QID #120 ml


Home Medications: 


 Home Meds





Aspirin 81 mg PO DAILY 09/03/13 [History]


Etodolac 400 mg PO BID 09/03/13 [History]


Levothyroxine Sodium [Synthroid] 75 mcg PO DAILY 09/03/13 [History]


Lisinopril [Prinivil] 5 mg PO DAILY 09/03/13 [History]


Nitroglycerin [Nitrostat] 0.4 mg SL ASDIRECTED PRN 09/03/13 [History]


Venlafaxine HCl [Venlafaxine ER] 150 mg PO DAILY 09/03/13 [History]


Multivitamin [Multivitamins] 1 each PO DAILY 11/08/13 [History]


Metoprolol Tartrate [Lopressor] 100 mg PO BID #60 tablet 12/08/14 [Rx]


Albuterol Sulfate [Proair Hfa] 2 puff INH Q4H PRN 12/12/17 [History]


atorvaSTATin [Lipitor] 20 mg PO DAILY 12/12/17 [History]


tiZANidine [Zanaflex] 4 mg PO TID PRN 12/12/17 [History]


Prochlorperazine Maleate [Compazine] 10 mg PO ASDIRECTED PRN 03/28/18 [History]


Lactobacillus Rhamnosus GG [Culturelle] 1 cap PO BID #60 cap 04/02/18 [Rx]


Vancomycin [Vancocin 250 MG/5 ML Soln] 125 mg PO QID #120 ml 04/02/18 [Rx]








Patient Handouts:  Clostridium Difficile Infection, Easy-to-Read


Referrals: 


Abigail Wolff MD [Ordering Only Provider] - 04/11/18 9:00 am (

Infusion center at 9:30 for 4 hrs)


Radha Williamson CNM [Primary Care Provider] - 04/09/18 1:30 pm (lab first 

at 1:00)





- Discharge Summary/Plan Comment


DC Time >30 min.: No





- Patient Data


Vitals - Most Recent: 


 Last Vital Signs











Temp  97.4 F   04/02/18 07:48


 


Pulse  66   04/02/18 08:01


 


Resp  18   04/02/18 07:48


 


BP  135/79   04/02/18 08:01


 


Pulse Ox  100   04/02/18 07:48











Weight - Most Recent: 195 lb 9.6 oz


I&O - Last 24 hours: 


 Intake & Output











 04/01/18 04/02/18 04/02/18





 22:59 06:59 14:59


 


Intake Total 940 400 280


 


Balance 940 400 280











Lab Results - Last 24 hrs: 


 Laboratory Results - last 24 hr











  04/02/18 04/02/18 Range/Units





  05:00 05:00 


 


WBC  2.2 L   (4.5-11.0)  K/uL


 


RBC  3.24 L   (3.30-5.50)  M/uL


 


Hgb  10.2 L   (12.0-15.0)  g/dL


 


Hct  32.1 L   (36.0-48.0)  %


 


MCV  99 H   (80-98)  fL


 


MCH  32 H   (27-31)  pg


 


MCHC  32   (32-36)  %


 


Plt Count  325   (150-400)  K/uL


 


Sodium   144  (140-148)  mmol/L


 


Potassium   4.5  (3.6-5.2)  mmol/L


 


Chloride   111 H  (100-108)  mmol/L


 


Carbon Dioxide   25  (21-32)  mmol/L


 


Anion Gap   12.5  (5.0-14.0)  mmol/L


 


BUN   14  D  (7-18)  mg/dL


 


Creatinine   1.1 H  (0.6-1.0)  mg/dL


 


Est Cr Clr Drug Dosing   34.95  mL/min


 


Estimated GFR (MDRD)   48 L  (>60)  


 


Glucose   85  ()  mg/dL


 


Calcium   8.4 L  (8.5-10.1)  mg/dL











SARAHY Results - Last 24 hrs: 


 Microbiology











 03/31/18 09:23 Stool Culture - Preliminary





 Stool / Feces    NORMAL ENTERIC MERY 2 DAYS





  - Final





    NEGATIVE FOR SHIGA TOXIN 1





  - Final





    NEGATIVE FOR SHIGA TOXIN 2











Med Orders - Current: 


 Current Medications





Acetaminophen (Tylenol)  650 mg PO Q4H PRN


   PRN Reason: Pain (Mild 1-3)/fever


   Last Admin: 04/02/18 04:14 Dose:  650 mg


Aspirin (Aspirin)  81 mg PO DAILY [facility]


   Last Admin: 04/02/18 08:00 Dose:  81 mg


Atorvastatin Calcium (Lipitor)  20 mg PO BEDTIME [facility]


   Last Admin: 04/01/18 20:50 Dose:  Not Given


Lactobacillus Rhamnosus (Culturelle)  1 cap PO BID [facility]


   Last Admin: 04/02/18 08:00 Dose:  1 cap


Levothyroxine Sodium (Levothyroxine)  75 mcg PO DAILY@0730 [facility]


   Last Admin: 04/02/18 07:59 Dose:  75 mcg


Lisinopril (Prinivil)  5 mg PO DAILY [facility]


   Last Admin: 04/02/18 08:00 Dose:  5 mg


Metoprolol Tartrate (Lopressor)  100 mg PO BID [facility]


   Last Admin: 04/02/18 08:01 Dose:  100 mg


Ondansetron HCl (Zofran Odt)  4 mg PO Q6H PRN


   PRN Reason: Nausea able to take PO


Ondansetron HCl (Zofran)  4 mg IV Q6H PRN


   PRN Reason: Nausea/Vomiting


Oxycodone HCl (Oxycodone)  5 mg PO Q4H PRN


   PRN Reason: Pain (moderate 4-6)


[facility]  [facility]


   Last Admin: 04/02/18 07:59 Dose:  40 mg


Tizanidine HCl (Zanaflex)  4 mg PO TID PRN


   PRN Reason: Spasms


Vancomycin HCl (Vancocin 250 Mg/5 Ml Soln)  125 mg PO QID [facility]


   Last Admin: 04/02/18 10:45 Dose:  125 mg


Venlafaxine HCl (Effexor Xr)  150 mg PO DAILY [facility]


   Last Admin: 04/02/18 08:01 Dose:  150 mg





Discontinued Medications





Etodolac (Etodolac)  400 mg PO BID [facility]


Sodium Chloride (Normal Saline)  1,000 mls @ 999 mls/hr IV ASDIRECTED [facility]


   Last Admin: 03/31/18 09:24 Dose:  999 mls/hr


Sodium Chloride (Normal Saline)  1,000 mls @ 125 mls/hr IV ASDIRECTED [facility]


   Last Admin: 04/01/18 06:43 Dose:  125 mls/hr


Loperamide HCl (Imodium)  4 mg PO ONETIME ONE


   Stop: 03/31/18 09:31


   Last Admin: 03/31/18 09:24 Dose:  4 mg











- Exam


General: Reports: Alert, Oriented, Cooperative, No Acute Distress


Lungs: Reports: Clear to Auscultation, Normal Respiratory Effort


Cardiovascular: Reports: Regular Rate, Regular Rhythm, No Murmurs


GI/Abdominal Exam: Soft, Non-Tender, No Organomegaly, No Distention


Extremities: Non-Tender, No Pedal Edema


Skin: Reports: Warm, Dry, Intact

## 2018-05-25 ENCOUNTER — HOSPITAL ENCOUNTER (OUTPATIENT)
Dept: HOSPITAL 11 - JP.SDS | Age: 73
Discharge: HOME | End: 2018-05-25
Attending: SURGERY
Payer: MEDICARE

## 2018-05-25 VITALS — DIASTOLIC BLOOD PRESSURE: 76 MMHG | SYSTOLIC BLOOD PRESSURE: 138 MMHG

## 2018-05-25 DIAGNOSIS — C56.9: ICD-10-CM

## 2018-05-25 DIAGNOSIS — N18.9: ICD-10-CM

## 2018-05-25 DIAGNOSIS — T82.898A: Primary | ICD-10-CM

## 2018-05-25 DIAGNOSIS — Z88.5: ICD-10-CM

## 2018-05-25 DIAGNOSIS — Z88.2: ICD-10-CM

## 2018-05-25 DIAGNOSIS — E03.9: ICD-10-CM

## 2018-05-25 DIAGNOSIS — I13.0: ICD-10-CM

## 2018-05-25 DIAGNOSIS — F32.9: ICD-10-CM

## 2018-05-25 PROCEDURE — 36561 INSERT TUNNELED CV CATH: CPT

## 2018-05-25 PROCEDURE — C1788 PORT, INDWELLING, IMP: HCPCS

## 2018-05-25 PROCEDURE — 71045 X-RAY EXAM CHEST 1 VIEW: CPT

## 2018-05-25 RX ADMIN — SODIUM CHLORIDE ONE ML: 0.9 INJECTION, SOLUTION INTRAVENOUS at 12:51

## 2018-05-25 RX ADMIN — SODIUM CHLORIDE ONE ML: 0.9 INJECTION, SOLUTION INTRAVENOUS at 13:38

## 2018-05-25 NOTE — CR
Chest 1V Frontal

 

INDICATION:    CHECKING FOR PNEUMO.

 

COMPARISON:    None

 

FINDINGS:   AP portable chest.

Left subclavian Port-A-Cath in place with tip in the left brachiocephalic vein. No pneumothorax or ot
her complication seen.

## 2018-05-29 NOTE — OR
DATE OF PROCEDURE:  05/25/2018

 

PREOPERATIVE DIAGNOSIS:  Metastatic ovarian cancer, poor venous access.

 

POSTOPERATIVE DIAGNOSIS:  Metastatic ovarian cancer, poor venous access.

 

PROCEDURE:  Left subclavian PowerPort placement.

 

SURGEON:  Elijah Cabrera MD

 

ANESTHESIA:  IV anesthesia with monitored anesthesia care.

 

INDICATION:  This 73-year-old white female has metastatic ovarian cancer.  Her 
peripheral

veins are becoming exhausted and a request is made for placement of a central 
venous IV 

access device.  I counseled her for placement of a PowerPort including risks 
and alternatives, 

and she gave her informed consent to proceed.

 

DESCRIPTION OF PROCEDURE:  After adequate IV anesthesia was obtained, the 
patient's upper

chest, shoulders, and neck were prepped and draped in the usual sterile 
fashion.  Time-out

was held.  Lidocaine 1% with epinephrine in a 50:50 mix with 0.5% Marcaine was 
infiltrated

about the left infraclavicular area.  The patient was placed in steep 
Trendelenburg.  The

left subclavian vein was cannulated with a needle, and a wire was passed 
through the needle

into the vein.  Fluoroscopically, it was noted to go down into her superior 
vena cava.  The

needle was removed.  A transverse incision was then made at the wire exit site 
to accept the

reservoir.  The reservoir and catheter which had previously been attached and 
flushed with

heparinized saline was obtained.  The reservoir was placed down in the pocket 
formed through

this incision inferior to the left clavicle.  The catheter was measured out to 
appropriate

length and cut so that it would end up in the superior vena cava.  The dilator 
was then

passed over the wire, and the dilator was removed.  The dilator with introducer 
were then

passed over the wire, and the wire and dilator were removed leaving the 
introducer in the

vein.  The catheter was then passed through the introducer into the vein, and 
the introducer

was removed leaving the catheter in the vein.  The reservoir was anchored with 2
-0 silk

suture to the underlying fascia.  The reservoir was accessed with a noncoring 
needle, and it

easily aspirated and flushed.  We checked it fluoroscopically, and it appeared 
that the

reservoir was placed further down in the pocket so that the end of the catheter 
appears to be 

in the innominate vein.  The subcutaneous tissue was closed with interrupted 3-
0 Vicryl 

suture; 4-0 Vicryl using a subcuticular stitch was placed to approximate the 
skin.  Steri-Strips 

were applied and a sterile dressing.  The patient tolerated the procedure well 
and was brought 

to recovery room in good condition.

 

 

 

 

Elijah Cabrera MD

DD:  05/25/2018 16:46:46

DT:  05/25/2018 16:57:53

Job #:  [id number]

MTDD

## 2018-06-06 ENCOUNTER — HOSPITAL ENCOUNTER (OUTPATIENT)
Dept: HOSPITAL 11 - JP.SDS | Age: 73
Discharge: HOME | End: 2018-06-06
Attending: SURGERY
Payer: MEDICARE

## 2018-06-06 VITALS — SYSTOLIC BLOOD PRESSURE: 148 MMHG | DIASTOLIC BLOOD PRESSURE: 76 MMHG

## 2018-06-06 DIAGNOSIS — Z88.2: ICD-10-CM

## 2018-06-06 DIAGNOSIS — N18.9: ICD-10-CM

## 2018-06-06 DIAGNOSIS — I50.9: ICD-10-CM

## 2018-06-06 DIAGNOSIS — I13.0: ICD-10-CM

## 2018-06-06 DIAGNOSIS — E78.00: ICD-10-CM

## 2018-06-06 DIAGNOSIS — Z88.5: ICD-10-CM

## 2018-06-06 DIAGNOSIS — T82.514A: Primary | ICD-10-CM

## 2018-06-06 PROCEDURE — C1788 PORT, INDWELLING, IMP: HCPCS

## 2018-06-06 NOTE — CR
CHEST: Portable

 

CLINICAL HISTORY:Catheter insertion

 

COMPARISON:5/25/2018

 

FINDINGS:  Heart size and pulmonary vascularity are normal. There are atherosclerotic changes in the 
aorta.. Patient has a left subclavian Hczkek-h-Knio catheter. The tip is in the brachiocephalic vein.
 There are severe degenerative changes in the right shoulder.

 

 

IMPRESSION:  No acute cardiopulmonary process

 

Left subclavian catheter remains in place

## 2018-06-07 NOTE — OR
DATE OF PROCEDURE:  06/06/2018

 

PREOPERATIVE DIAGNOSIS:  Nonfunctional left subclavian PowerPort, reservoir has 
flipped.

 

POSTOPERATIVE DIAGNOSIS:  Nonfunctional left subclavian PowerPort, reservoir 
has flipped.

 

PROCEDURE:  Replacement of the left subclavian PowerPort.



SURGEON:  lEijah Cabrera MD.  

 

ANESTHESIA:  IV anesthesia with monitored anesthesia care.

 

INDICATION:  This 73-year-old white female has ovarian cancer.  A left 
subclavian PowerPort

was placed.  They tried to access this yesterday and it appeared to have 
flipped over.  She

is taken to the operating room for repair of this.  I counseled her for that, 
and she gave

her informed consent to proceed.

 

DESCRIPTION OF PROCEDURE:  After adequate IV anesthesia was obtained, the 
patient's upper

chest, shoulders, and neck were prepped and draped in the usual sterile 
fashion.  Time-out

was held.  Lidocaine 1% with epinephrine in a 50:50 mix with 0.5% Marcaine was 
infiltrated

about the left infraclavicular area.  An incision was made through her 
infraclavicular scar

and the underlying PowerPort was identified and indeed it had flipped over.  In 
the process

of trying to sew it back down in the correct position, the catheter came out 
too far to be

used, and we could not get it to go back in.  We then opened the catheter, 
placed a wire

through it, and then removed the catheter and reservoir.  A new catheter was 
measured to

appropriate length, attached to a new reservoir which was then flushed with 
heparinized

saline.  The dilator with introducer were passed over the wire, and the wire 
and dilator

were removed leaving the introducer in the vein.  The catheter was passed 
through the

introducer into the vein and the introducer was removed.  We then anchored the 
PowerPort

with 2-0 silk suture to the underlying fascia as best we could at three places.
  It was 

accessed and aspirated blood and it was then flushed with heparinized saline.  
All looked well.

Fluoroscopically, it looked good.  The subcutaneous tissue was closed with a 
running stitch

of 3-0 Vicryl, 4-0 Vicryl using a subcuticular stitch was placed to approximate 
the skin.

Steri-Strips and a sterile dressing were applied.  She tolerated the procedure 
well and was

brought to the recovery room in a good condition.

 

 

 

 

Elijah Cabrera MD

DD:  06/06/2018 13:08:53

DT:  06/06/2018 14:28:27

Job #:  [id number]

JIM

## 2018-06-16 ENCOUNTER — HOSPITAL ENCOUNTER (EMERGENCY)
Dept: HOSPITAL 11 - JP.ED | Age: 73
Discharge: HOME | End: 2018-06-16
Payer: MEDICARE

## 2018-06-16 VITALS — DIASTOLIC BLOOD PRESSURE: 96 MMHG | SYSTOLIC BLOOD PRESSURE: 123 MMHG

## 2018-06-16 DIAGNOSIS — C56.9: ICD-10-CM

## 2018-06-16 DIAGNOSIS — T45.1X5A: ICD-10-CM

## 2018-06-16 DIAGNOSIS — E03.9: ICD-10-CM

## 2018-06-16 DIAGNOSIS — J45.909: ICD-10-CM

## 2018-06-16 DIAGNOSIS — R11.10: ICD-10-CM

## 2018-06-16 DIAGNOSIS — Z88.5: ICD-10-CM

## 2018-06-16 DIAGNOSIS — Z88.2: ICD-10-CM

## 2018-06-16 DIAGNOSIS — R19.7: Primary | ICD-10-CM

## 2018-06-16 DIAGNOSIS — I10: ICD-10-CM

## 2018-06-16 PROCEDURE — 80053 COMPREHEN METABOLIC PANEL: CPT

## 2018-06-16 PROCEDURE — 83735 ASSAY OF MAGNESIUM: CPT

## 2018-06-16 PROCEDURE — 96361 HYDRATE IV INFUSION ADD-ON: CPT

## 2018-06-16 PROCEDURE — 99284 EMERGENCY DEPT VISIT MOD MDM: CPT

## 2018-06-16 PROCEDURE — 96366 THER/PROPH/DIAG IV INF ADDON: CPT

## 2018-06-16 PROCEDURE — 85025 COMPLETE CBC W/AUTO DIFF WBC: CPT

## 2018-06-16 PROCEDURE — 96365 THER/PROPH/DIAG IV INF INIT: CPT

## 2018-06-16 PROCEDURE — 96375 TX/PRO/DX INJ NEW DRUG ADDON: CPT

## 2018-06-16 PROCEDURE — 36415 COLL VENOUS BLD VENIPUNCTURE: CPT

## 2018-06-16 NOTE — EDM.PDOC
ED HPI GENERAL MEDICAL PROBLEM





- General


Chief Complaint: Gastrointestinal Problem


Stated Complaint: WEAKNESS, LIGHT HEADED


Time Seen by Provider: 06/16/18 11:13





- History of Present Illness


INITIAL COMMENTS - FREE TEXT/NARRATIVE: 





Wen presents today with complaints of abdominal cramping type pain and 

diarrhea since 0700 today with >6 stools.  She reports history of loose stools 

for the past three days since chemo infusion.  She states today she drank water 

and coffee with her morning medications and proceeded to have watery stool 

after stool this morning. 


She also complains of feeling weak, nauseated and dizzy.





She reports recent chemotherapy infusion, blood transfusion of 2 PRBC this past 

week.  


She denies fever, chills, vomiting.  





- Related Data


 Allergies











Allergy/AdvReac Type Severity Reaction Status Date / Time


 


Sulfa (Sulfonamide Allergy Unknown Rash Verified 06/16/18 11:33





Antibiotics)     


 


codeine AdvReac Unknown Vomiting Verified 06/16/18 11:33











Home Meds: 


 Home Meds





Aspirin 81 mg PO DAILY 09/03/13 [History]


Levothyroxine Sodium [Synthroid] 75 mcg PO DAILY 09/03/13 [History]


Lisinopril [Prinivil] 5 mg PO DAILY 09/03/13 [History]


Nitroglycerin [Nitrostat] 0.4 mg SL ASDIRECTED PRN 09/03/13 [History]


Venlafaxine HCl [Venlafaxine ER] 150 mg PO DAILY 09/03/13 [History]


Multivitamin [Multivitamins] 1 each PO DAILY 11/08/13 [History]


Metoprolol Tartrate [Lopressor] 100 mg PO BID #60 tablet 12/08/14 [Rx]


Albuterol Sulfate [Proair Hfa] 2 puff INH Q4H PRN 12/12/17 [History]


tiZANidine [Zanaflex] 4 mg PO TID PRN 12/12/17 [History]


Prochlorperazine Maleate [Compazine] 10 mg PO ASDIRECTED PRN 03/28/18 [History]


Lactobacillus Rhamnosus GG [Culturelle] 1 cap PO BID #60 cap 04/02/18 [Rx]


Pantoprazole [ProTONIX***] 40 mg PO DAILY #30 tab.cr 04/02/18 [Rx]


Acetaminophen/HYDROcodone [Norco 325-5 MG] 1 - 2 tab PO Q4HR PRN 05/17/18 [

History]


Cholecalciferol (Vitamin D3) [Vitamin D] 1,000 units PO DAILY 05/17/18 [History]


Cyanocobalamin (Vitamin B-12) [Vitamin B-12] 2,500 mcg SL DAILY 05/17/18 [

History]


Furosemide 10 mg PO DAILY 05/17/18 [History]











Past Medical History


HEENT History: Reports: Hard of Hearing, Impaired Vision


Other HEENT History: wears glasses


Cardiovascular History: Reports: Heart Murmur, Hypertension, Syncope


Respiratory History: Reports: Asthma, Bronchitis, Recurrent, Other (See Below)


Other Respiratory History: fluid in lungs


Gastrointestinal History: Reports: None


Genitourinary History: Reports: Other (See Below)


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


OB/GYN History: Reports: Pregnancy


Musculoskeletal History: Reports: Arthritis, Back Pain, Chronic, Fibromyalgia


Neurological History: Reports: Vertigo


Other Neuro History: fibro myalgia


Psychiatric History: Reports: Depression


Endocrine/Metabolic History: Reports: Hypothyroidism, Obesity/BMI 30+


Hematologic History: Reports: Blood Transfusion(s)


Oncologic (Cancer) History: Reports: Ovarian


Dermatologic History: Reports: Other (See Below)


Other Dermatologic History: open sores on body





- Infectious Disease History


Infectious Disease History: Reports: Chicken Pox, Measles, Mumps, Shingles





- Past Surgical History


Head Surgeries/Procedures: Reports: None


HEENT Surgical History: Reports: None


Cardiovascular Surgical History: Reports: None


Respiratory Surgical History: Reports: Thoracentesis


GI Surgical History: Reports: Appendectomy, Cholecystectomy, Colonoscopy, Hernia

, Abdominal, Other (See Below)


Other GI Surgeries/Procedures: colon resection r/t CA


Female  Surgical History: Reports: Hysterectomy, Oophorectomy, Salpingo-

Oophorectomy


Endocrine Surgical History: Reports: None


Neurological Surgical History: Reports: None


Musculoskeletal Surgical History: Reports: Knee Replacement


Oncologic Surgical History: Reports: Other (See Below)


Other Oncologic Surgeries/Procedures: colon resection; JAZMIN


Dermatological Surgical History: Reports: None





Social & Family History





- Family History


Family Medical History: Noncontributory


Cardiac: Reports: Afib, Arrhythmia


Respiratory: Reports: Asthma


Neurological: Reports: CVA


Oncologic: Reports: Breast, Lymphoma, Ovarian





- Tobacco Use


Smoking Status *Q: Never Smoker





- Caffeine Use


Caffeine Use: Reports: Coffee





ED ROS GENERAL





- Review of Systems


Review Of Systems: See Below


Constitutional: Reports: Weakness, Decreased Appetite.  Denies: Fever, Chills


HEENT: Reports: No Symptoms


Respiratory: Denies: Shortness of Breath, Wheezing, Cough, Sputum, Hemoptysis


Cardiovascular: Denies: Chest Pain, Dyspnea on Exertion, Edema, Lightheadedness

, Palpitations, PND, Syncope


Endocrine: Reports: No Symptoms


GI/Abdominal: Reports: Abdominal Pain, Diarrhea, Decreased Appetite, Nausea.  

Denies: Black Stool, Bloody Stool, Constipation, Difficulty Swallowing, Flatus, 

Melena, Stool Incontinence, Vomiting


: Reports: No Symptoms


Musculoskeletal: Reports: No Symptoms


Skin: Reports: Dryness, Bruising, Other (Bruising to left implanted port site)


Neurological: Denies: Confusion, Dizziness, Headache, Numbness, Syncope, 

Tingling, Difficulty Walking, Weakness, Gait Disturbance


Psychiatric: Reports: No Symptoms


Hematologic/Lymphatic: Reports: No Symptoms


Immunologic: Reports: No Symptoms





ED EXAM, GI/ABD





- Physical Exam


Exam: See Below


Exam Limited By: No Limitations


General Appearance: Alert, WD/WN, Mild Distress


Eyes: Bilateral: Normal Appearance, EOMI


Ears: Normal External Exam, Normal Canal, Hearing Grossly Normal, Normal TMs


Nose: Normal Inspection, Normal Mucosa, No Blood


Throat/Mouth: Normal Inspection, Normal Lips, Normal Oropharynx, Normal Voice, 

No Airway Compromise


Head: Atraumatic, Normocephalic


Neck: Normal Inspection, Supple, Non-Tender, Full Range of Motion.  No: 

Lymphadenopathy (R), Lymphadenopathy (L)


Respiratory/Chest: No Respiratory Distress, Lungs Clear, Normal Breath Sounds, 

No Accessory Muscle Use, Chest Non-Tender


Cardiovascular: Normal Peripheral Pulses, Regular Rate, Rhythm, No Edema, No 

Murmur, No Rub


GI/Abdominal Exam: Soft, Non-Tender, No Organomegaly, No Distention, No Mass, 

Other (hyperactive bowel sounds).  No: Guarding, Rigid, Rebound, Hernia


Back Exam: Normal Inspection, Full Range of Motion.  No: CVA Tenderness (R), 

CVA Tenderness (L)


Extremities: Normal Inspection, Normal Range of Motion, Non-Tender, No Pedal 

Edema, Normal Capillary Refill


Neurological: Alert, Oriented, Normal Cognition, Normal Reflexes, No Motor/

Sensory Deficits


Psychiatric: Normal Affect, Normal Mood


Skin Exam: Warm, Dry, Normal Color, Ecchymosis.  No: Diaphoretic, Erythema, 

Increased Warmth


Lymphatic: No Adenopathy





Course





- Vital Signs


Last Recorded V/S: 


 Last Vital Signs











Temp  36.6 C   06/16/18 12:02


 


Pulse  71   06/16/18 12:02


 


Resp  14   06/16/18 12:02


 


BP  123/96 H  06/16/18 12:02


 


Pulse Ox  98   06/16/18 12:02














- Orders/Labs/Meds


Orders: 


 Active Orders 24 hr











 Category Date Time Status


 


 Saline Lock Insert [OM.PC] Routine Oth  06/16/18 11:35 Ordered











Labs: 


 Laboratory Tests











  06/16/18 06/16/18 06/16/18 Range/Units





  11:36 11:36 11:37 


 


WBC  7.6    (4.5-11.0)  K/uL


 


RBC  3.54    (3.30-5.50)  M/uL


 


Hgb  11.3 L    (12.0-15.0)  g/dL


 


Hct  34.2 L    (36.0-48.0)  %


 


MCV  97    (80-98)  fL


 


MCH  32 H    (27-31)  pg


 


MCHC  33    (32-36)  %


 


Plt Count  458 H    (150-400)  K/uL


 


Add Manual Diff  Yes    


 


Neutrophils % (Manual)  51    (36-66)  %


 


Band Neutrophils %  26 H    (5-11)  %


 


Lymphocytes % (Manual)  18 L    (24-44)  %


 


Monocytes % (Manual)  5    (2-6)  %


 


Sodium   140   (140-148)  mmol/L


 


Potassium   5.0   (3.6-5.2)  mmol/L


 


Chloride   104   (100-108)  mmol/L


 


Carbon Dioxide   27   (21-32)  mmol/L


 


Anion Gap   9.3   (5.0-14.0)  mmol/L


 


BUN   16   (7-18)  mg/dL


 


Creatinine   1.1 H   (0.6-1.0)  mg/dL


 


Est Cr Clr Drug Dosing   34.95   mL/min


 


Estimated GFR (MDRD)   48 L   (>60)  


 


Glucose   131 H   ()  mg/dL


 


Calcium   8.7   (8.5-10.1)  mg/dL


 


Magnesium    1.6 L  (1.8-2.4)  mg/dL


 


Total Bilirubin   0.6  D   (0.2-1.0)  mg/dL


 


AST   22   (15-37)  U/L


 


ALT   22   (12-78)  U/L


 


Alkaline Phosphatase   118 H   ()  U/L


 


Total Protein   7.2   (6.4-8.2)  g/dL


 


Albumin   3.2 L   (3.4-5.0)  g/dL


 


Globulin   4.0 H   (2.3-3.5)  g/dL


 


Albumin/Globulin Ratio   0.8 L   (1.2-2.2)  








Patient lab work reviewed with her, we will infuse MG+ IV along with LR.


Patient in agreement with plan. 


Meds: 


Medications














Discontinued Medications














Generic Name Dose Route Start Last Admin





  Trade Name Freq  PRN Reason Stop Dose Admin


 


Lactated Ringer's  1,000 mls @ 1,000 mls/hr  06/16/18 11:35  06/16/18 12:06





  Ringers, Lactated  IV  06/16/18 12:34  1,000 mls/hr





  BOLUS ONE   Administration





     





     





     





     


 


Magnesium Sulfate 2 gm/ Premix  50 mls @ 25 mls/hr  06/16/18 13:00  06/16/18 13:

10





  IV  06/16/18 14:59  25 mls/hr





  ONETIME ONE   Administration





     





     





     





     


 


Lactated Ringer's  1,000 mls @ 1,000 mls/hr  06/16/18 14:16  06/16/18 14:25





  Ringers, Lactated  IV  06/16/18 15:15  1,000 mls/hr





  BOLUS ONE   Administration





     





     





     





     


 


Meclizine HCl  25 mg  06/16/18 12:20  06/16/18 12:30





  Antivert  PO  06/16/18 12:21  25 mg





  ONETIME ONE   Administration





     





     





     





     


 


Ondansetron HCl  4 mg  06/16/18 12:03  06/16/18 12:06





  Zofran  IVPUSH  06/16/18 12:04  4 mg





  ONETIME ONE   Administration





     





     





     





     


 


Ondansetron HCl  4 mg  06/16/18 14:16  06/16/18 14:25





  Zofran  IVPUSH  06/16/18 14:17  4 mg





  ONETIME ONE   Administration





     





     





     





     


 


Sodium Chloride  10 ml  06/16/18 11:35  06/16/18 12:05





  Saline Flush  FLUSH   10 ml





  ASDIRECTED PRN   Administration





  Keep Vein Open   





     





     





     














- Re-Assessments/Exams


Free Text/Narrative Re-Assessment/Exam: 





06/16/18 14:00


Patient status and labwork reviewed with Dr. Mellwood, she is in agreement 

with plan. 





We will administer LR 2000ml IV total, discharge to home with loperamide for 

diarrhea. 


Wen reports improvement of vertigo. 


Patient had no diarrhea stools while in the emergency room. 











Departure





- Departure


Time of Disposition: 15:50


Disposition: Home, Self-Care 01


Condition: Fair


Clinical Impression: 


 Diarrhea, Nausea, Side effect of drug








- Discharge Information


Instructions:  Nausea, Adult, Easy-to-Read, Diarrhea, Adult, Easy-to-Read


Referrals: 


Radha Williamson CNM [Primary Care Provider] - 


Forms:  ED Department Discharge


Additional Instructions: 


You have been evaluated and treated for diarrhea and nausea today. 





You were given zofran 4 mg IV x 2 doses for nausea, IV magnesium and IV fluids 

to help with hydration and electrolytes.





You may take loperamide 2 mg by mouth as directed.  Take two tablets (4mg) 

after first diarrhea stool.  Then you may take one tablet (2mg) by mouth after 

each diarrhea stool up to a total of 16 mg total per day. 





Follow up with your primary provider in 3 to 7 days for a recheck.





Keep yourself hydrated by drinking plenty of fluids. 





Return for worsening, issues or concerns.  





- My Orders


Last 24 Hours: 


My Active Orders





06/16/18 11:35


Saline Lock Insert [OM.PC] Routine 














- Assessment/Plan


Last 24 Hours: 


My Active Orders





06/16/18 11:35


Saline Lock Insert [OM.PC] Routine 











Assessment:: 





 Diarrhea, Nausea, Side effect of drug - chemotherapy agent


 


Plan: 


 Diarrhea, Nausea, Side effect of drug - chemotherapy








Patient evaluated and treated for diarrhea and nausea today. 





She was given zofran 4 mg IV x 2 doses for nausea, IV magnesium and IV fluids 

to help with hydration and electrolytes.





She may take loperamide 2 mg by mouth as directed.  Take two tablets (4mg) 

after first diarrhea stool.  Then you may take one tablet (2mg) by mouth after 

each diarrhea stool up to a total of 16 mg total per day. 





Follow up with primary provider in 3 to 7 days for a recheck.





Keep hydrated by drinking plenty of fluids. 





Return for worsening, issues or concerns.